# Patient Record
Sex: FEMALE | Race: WHITE | Employment: FULL TIME | URBAN - METROPOLITAN AREA
[De-identification: names, ages, dates, MRNs, and addresses within clinical notes are randomized per-mention and may not be internally consistent; named-entity substitution may affect disease eponyms.]

---

## 2021-11-18 ENCOUNTER — ANESTHESIA EVENT (OUTPATIENT)
Dept: ENDOSCOPY | Age: 56
End: 2021-11-18
Payer: COMMERCIAL

## 2021-11-19 ENCOUNTER — ANESTHESIA (OUTPATIENT)
Dept: ENDOSCOPY | Age: 56
End: 2021-11-19
Payer: COMMERCIAL

## 2021-11-19 ENCOUNTER — HOSPITAL ENCOUNTER (OUTPATIENT)
Age: 56
Setting detail: OUTPATIENT SURGERY
Discharge: HOME | End: 2021-11-19
Attending: INTERNAL MEDICINE | Admitting: INTERNAL MEDICINE
Payer: COMMERCIAL

## 2021-11-19 VITALS
WEIGHT: 145 LBS | HEART RATE: 67 BPM | SYSTOLIC BLOOD PRESSURE: 119 MMHG | BODY MASS INDEX: 25.69 KG/M2 | TEMPERATURE: 97 F | DIASTOLIC BLOOD PRESSURE: 89 MMHG | RESPIRATION RATE: 15 BRPM | OXYGEN SATURATION: 100 % | HEIGHT: 63 IN

## 2021-11-19 VITALS — OXYGEN SATURATION: 96 % | SYSTOLIC BLOOD PRESSURE: 129 MMHG | DIASTOLIC BLOOD PRESSURE: 76 MMHG

## 2021-11-19 PROCEDURE — 2580000003 HC RX 258: Performed by: ANESTHESIOLOGY

## 2021-11-19 PROCEDURE — 7100000011 HC PHASE II RECOVERY - ADDTL 15 MIN: Performed by: INTERNAL MEDICINE

## 2021-11-19 PROCEDURE — 2709999900 HC NON-CHARGEABLE SUPPLY: Performed by: INTERNAL MEDICINE

## 2021-11-19 PROCEDURE — 3700000001 HC ADD 15 MINUTES (ANESTHESIA): Performed by: INTERNAL MEDICINE

## 2021-11-19 PROCEDURE — C1726 CATH, BAL DIL, NON-VASCULAR: HCPCS | Performed by: INTERNAL MEDICINE

## 2021-11-19 PROCEDURE — 3609017700 HC EGD DILATION GASTRIC/DUODENAL STRICTURE: Performed by: INTERNAL MEDICINE

## 2021-11-19 PROCEDURE — 3609010600 HC COLONOSCOPY POLYPECTOMY SNARE/COLD BIOPSY: Performed by: INTERNAL MEDICINE

## 2021-11-19 PROCEDURE — 2500000003 HC RX 250 WO HCPCS: Performed by: NURSE ANESTHETIST, CERTIFIED REGISTERED

## 2021-11-19 PROCEDURE — 3700000000 HC ANESTHESIA ATTENDED CARE: Performed by: INTERNAL MEDICINE

## 2021-11-19 PROCEDURE — 6360000002 HC RX W HCPCS: Performed by: NURSE ANESTHETIST, CERTIFIED REGISTERED

## 2021-11-19 PROCEDURE — 7100000010 HC PHASE II RECOVERY - FIRST 15 MIN: Performed by: INTERNAL MEDICINE

## 2021-11-19 RX ORDER — SODIUM CHLORIDE, SODIUM LACTATE, POTASSIUM CHLORIDE, CALCIUM CHLORIDE 600; 310; 30; 20 MG/100ML; MG/100ML; MG/100ML; MG/100ML
INJECTION, SOLUTION INTRAVENOUS CONTINUOUS
Status: DISCONTINUED | OUTPATIENT
Start: 2021-11-19 | End: 2021-11-19 | Stop reason: HOSPADM

## 2021-11-19 RX ORDER — PROPOFOL 10 MG/ML
INJECTION, EMULSION INTRAVENOUS CONTINUOUS PRN
Status: DISCONTINUED | OUTPATIENT
Start: 2021-11-19 | End: 2021-11-19 | Stop reason: SDUPTHER

## 2021-11-19 RX ORDER — IBUPROFEN 200 MG
200 TABLET ORAL
COMMUNITY

## 2021-11-19 RX ORDER — LIDOCAINE HYDROCHLORIDE 20 MG/ML
INJECTION, SOLUTION INFILTRATION; PERINEURAL PRN
Status: DISCONTINUED | OUTPATIENT
Start: 2021-11-19 | End: 2021-11-19 | Stop reason: SDUPTHER

## 2021-11-19 RX ORDER — TRAZODONE HYDROCHLORIDE 100 MG/1
100 TABLET ORAL NIGHTLY
COMMUNITY
Start: 2021-10-28

## 2021-11-19 RX ORDER — ONDANSETRON 2 MG/ML
4 INJECTION INTRAMUSCULAR; INTRAVENOUS
Status: DISCONTINUED | OUTPATIENT
Start: 2021-11-19 | End: 2021-11-19 | Stop reason: HOSPADM

## 2021-11-19 RX ORDER — VALACYCLOVIR HYDROCHLORIDE 1 G/1
2000 TABLET, FILM COATED ORAL 2 TIMES DAILY
COMMUNITY
Start: 2021-07-20

## 2021-11-19 RX ORDER — PROPOFOL 10 MG/ML
INJECTION, EMULSION INTRAVENOUS PRN
Status: DISCONTINUED | OUTPATIENT
Start: 2021-11-19 | End: 2021-11-19 | Stop reason: SDUPTHER

## 2021-11-19 RX ADMIN — LIDOCAINE HYDROCHLORIDE 100 MG: 20 INJECTION, SOLUTION INFILTRATION; PERINEURAL at 10:51

## 2021-11-19 RX ADMIN — PROPOFOL 50 MG: 10 INJECTION, EMULSION INTRAVENOUS at 10:51

## 2021-11-19 RX ADMIN — PROPOFOL 200 MCG/KG/MIN: 10 INJECTION, EMULSION INTRAVENOUS at 10:51

## 2021-11-19 RX ADMIN — SODIUM CHLORIDE, POTASSIUM CHLORIDE, SODIUM LACTATE AND CALCIUM CHLORIDE: 600; 310; 30; 20 INJECTION, SOLUTION INTRAVENOUS at 10:12

## 2021-11-19 ASSESSMENT — PULMONARY FUNCTION TESTS
PIF_VALUE: 1

## 2021-11-19 ASSESSMENT — PAIN - FUNCTIONAL ASSESSMENT
PAIN_FUNCTIONAL_ASSESSMENT: 0-10
PAIN_FUNCTIONAL_ASSESSMENT: 0-10

## 2021-11-19 NOTE — ANESTHESIA POSTPROCEDURE EVALUATION
Department of Anesthesiology  Postprocedure Note    Patient: Sylvania Sicard  MRN: [id number]  YOB: 1965  Date of evaluation: 11/19/2021  Time:  12:15 PM     Procedure Summary     Date: 11/19/21 Room / Location: [facility]    Anesthesia Start: 7170 Anesthesia Stop: 1122    Procedures:       EGD DILATION BALLOON (N/A )      COLONOSCOPY POLYPECTOMY SNARE/COLD BIOPSY Diagnosis:       Colon cancer screening      Dysphagia, unspecified type      (Colon cancer screening [Z12.11] Dysphagia, unspecified type [R13.10])    Surgeons: Mireille Gill Responsible Provider: Shelli Pink DO    Anesthesia Type: MAC ASA Status: 2          Anesthesia Type: MAC    Adela Phase I: Adela Score: 10    Adela Phase II: Adela Score: 10    Last vitals: Reviewed and per EMR flowsheets.        Anesthesia Post Evaluation    Patient location during evaluation: PACU  Patient participation: complete - patient participated  Level of consciousness: awake  Pain score: 0  Airway patency: patent  Nausea & Vomiting: no nausea and no vomiting  Complications: no  Cardiovascular status: blood pressure returned to baseline  Respiratory status: acceptable  Hydration status: euvolemic

## 2021-11-19 NOTE — H&P
Gastroenterology Note             Pre-operative History and Physical    Patient: Emelina Ghotra  : 1965  CSN: [id number]    History Obtained From:  patient and/or guardian. HISTORY OF PRESENT ILLNESS:    The patient is a 64 y.o. female  here for history of distal esophageal stricture, dysphagia, chronic GERD, history of polyps, colonoscopy for follow-up of polyps. .      Past Medical History:    History reviewed. No pertinent past medical history. Past Surgical History:    Past Surgical History:   Procedure Laterality Date    ABDOMEN SURGERY       - c section    COLONOSCOPY      ENDOSCOPY, COLON, DIAGNOSTIC      FRACTURE SURGERY      R foot pins     Medications Prior to Admission:   No current facility-administered medications on file prior to encounter. Current Outpatient Medications on File Prior to Encounter   Medication Sig Dispense Refill    ibuprofen (ADVIL;MOTRIN) 200 MG tablet Take 200 mg by mouth      traZODone (DESYREL) 100 MG tablet Take 100 mg by mouth nightly      valACYclovir (VALTREX) 1 g tablet Take 2,000 mg by mouth 2 times daily          Allergies:  Patient has no known allergies. Social History:   Social History     Tobacco Use    Smoking status: Never Smoker    Smokeless tobacco: Never Used   Substance Use Topics    Alcohol use: Yes     Comment: 1-2 / week     Family History:   History reviewed. No pertinent family history. PHYSICAL EXAM:      /80   Pulse 94   Temp 96.6 °F (35.9 °C) (Tympanic)   Resp 16   Ht 5' 3\" (1.6 m)   Wt 145 lb (65.8 kg)   SpO2 97%   BMI 25.69 kg/m²  I        Heart:   within normal limits    Lungs:  CTA bilat anteriorly,  Normal effort    Abdomen:   soft, NT, ND      ASA Grade:  ASA 2 - Patient with mild systemic disease with no functional limitations    Mallampati Class: 2      ASSESSMENT AND PLAN:    1. Patient is a 64 y.o. female here for EGD/Colonoscopy.    2.  Procedure options, risks and benefits reviewed with patient. Patient expresses understanding and wishes to proceed. [address] [place] [address], ,   [facility] [facility]  11/19/2021    Please note that some or all of this record was generated using voice recognition software. If there are any questions about the content of this document, please contact the author as some errors in translation may have occurred.

## 2021-11-19 NOTE — PROCEDURES
EGD and Colonoscopy Procedure  Note            Patient: Nathalie Jasso  : 1965  CSN: [id number]    Procedure: Esophagogastroduodenoscopy with esophageal balloon dilation, colonoscopy with snare polypectomy    Date:  2021     Surgeon:  Julieta Nixon,      Referring Physician:  Virgina Brunner    Preoperative Diagnosis:  Colon cancer screening [Z12.11] Dysphagia, unspecified type [R13.10]    Postoperative Diagnosis: The GE junction was located at 38 cm, a small stricture noted in the distal esophagus at the level of the GE junction dilated to 20 mm with mucosal tearing and heme observed post dilation. No erosive esophagitis or Miller's esophagus noted. A small 2 cm sliding hiatal hernia noted. Mild antral gastritis. Normal duodenum. Scattered diverticuli noted throughout the colon. Small sigmoid polyp removed by cold snare polypectomy with tissue loss to the time of retrieval.  Medium internal hemorrhoids. Anesthesia:  MAC    EBL: minimal to none    Indications: This is a 64y.o. year old female who presents today with dysphagia, GERD, history of polyps. Procedure Details: The Olympus video endoscope was passed through the hypopharynx into the esophagus. The scope was advanced all the way to the second portion of the duodenum. The GE junction was at approximately 38 cms. The scope was slowly withdrawn and mucosa was carefully evaluated including a retroflex view of the proximal stomach. Findings and interventions are described below. The patient was decompressed and the scope was then withdrawn. Gastric or Duodenal ulcer present: No    Procedure Details:    With the patient in left lateral decubitus position a digital rectal examination was performed. The Olympus video colonoscope was inserted through the anorectal area into the rectum. The scope was then advanced through the length of the colon to the terminal ileum. The quality of preparation was adequate. The scope was then slowly withdrawn with careful inspection of the mucosa including retroflexion in the rectum. Findings and interventions are described below. The patient was decompressed. The scope was then withdrawn. The patient was taken to recovery in good condition. There were no immediate complications. Impression:     Upper endoscopy    -The GE junction was located at 38 cm, a small stricture noted in the distal esophagus at the level of the GE junction dilated to 20 mm with mucosal tearing and heme observed post dilation.    -No erosive esophagitis or Miller's esophagus noted. - A small 2 cm sliding hiatal hernia noted. Mild antral gastritis. - Normal duodenum. Colonoscopy  - Scattered diverticuli noted throughout the colon. -  Small sigmoid polyp removed by cold snare polypectomy with tissue loss to the time of retrieval.   -- Medium internal hemorrhoids. Recommendations:     1. Continue pantoprazole daily  2. Repeat upper endoscopy with dilation as needed  3. High-fiber diet  4. Repeat colonoscopy in 5 years      Fuller MD   [facility]  11/19/2021    Please note that some or all of this record was generated using voice recognition software. If there are any questions about the content of this document, please contact the author as some errors in translation may have occurred.

## 2021-11-19 NOTE — ANESTHESIA PRE PROCEDURE
127/80       NPO Status: Time of last liquid consumption: 0615                        Time of last solid consumption: 2000                        Date of last liquid consumption: 11/19/21                        Date of last solid food consumption: 11/17/21    BMI:   Wt Readings from Last 3 Encounters:   11/19/21 145 lb (65.8 kg)     Body mass index is 25.69 kg/m². CBC: No results found for: WBC, RBC, HGB, HCT, MCV, RDW, PLT    CMP: No results found for: NA, K, CL, CO2, BUN, CREATININE, GFRAA, AGRATIO, LABGLOM, GLUCOSE, PROT, CALCIUM, BILITOT, ALKPHOS, AST, ALT    POC Tests: No results for input(s): POCGLU, POCNA, POCK, POCCL, POCBUN, POCHEMO, POCHCT in the last 72 hours. Coags: No results found for: PROTIME, INR, APTT    HCG (If Applicable): No results found for: PREGTESTUR, PREGSERUM, HCG, HCGQUANT     ABGs: No results found for: PHART, PO2ART, EGE7VRV, AFI0THV, BEART, A7UTBCWE     Type & Screen (If Applicable):  No results found for: LABABO, LABRH    Drug/Infectious Status (If Applicable):  No results found for: HIV, HEPCAB    COVID-19 Screening (If Applicable): No results found for: COVID19        Anesthesia Evaluation  Patient summary reviewed and Nursing notes reviewed  Airway: Mallampati: II  TM distance: >3 FB   Neck ROM: full  Mouth opening: > = 3 FB Dental: normal exam         Pulmonary:Negative Pulmonary ROS and normal exam  breath sounds clear to auscultation                             Cardiovascular:Negative CV ROS  Exercise tolerance: good (>4 METS),           Rhythm: regular  Rate: normal           Beta Blocker:  Not on Beta Blocker         Neuro/Psych:   Negative Neuro/Psych ROS              GI/Hepatic/Renal: Neg GI/Hepatic/Renal ROS            Endo/Other: Negative Endo/Other ROS                    Abdominal:       Abdomen: soft. Vascular: negative vascular ROS. Other Findings:             Anesthesia Plan      MAC     ASA 2       Induction: intravenous.     MIPS: Postoperative

## (undated) DEVICE — FORCEPS BX L240CM JAW DIA2.8MM L CAP W/ NDL MIC MESH TOOTH

## (undated) DEVICE — DILATOR ENDOSCP L180CM DIA6FR BLLN L8CM DIA54-60FR

## (undated) DEVICE — CANNULA SAMP CO2 AD GRN 7FT CO2 AND 7FT O2 TBNG UNIV CONN

## (undated) DEVICE — SYRINGE INFL 60ML DISP ALLIANCE II

## (undated) DEVICE — SNARE COLD DIAMOND 10MM THIN

## (undated) DEVICE — TRAP SPEC RETRV CLR PLAS POLYP IN LN SUCT QUIK CTCH